# Patient Record
Sex: MALE | Race: WHITE | NOT HISPANIC OR LATINO | Employment: FULL TIME | URBAN - METROPOLITAN AREA
[De-identification: names, ages, dates, MRNs, and addresses within clinical notes are randomized per-mention and may not be internally consistent; named-entity substitution may affect disease eponyms.]

---

## 2017-02-12 ENCOUNTER — HOSPITAL ENCOUNTER (EMERGENCY)
Facility: HOSPITAL | Age: 27
Discharge: HOME | End: 2017-02-12
Attending: EMERGENCY MEDICINE | Admitting: EMERGENCY MEDICINE

## 2017-02-12 VITALS
DIASTOLIC BLOOD PRESSURE: 96 MMHG | TEMPERATURE: 98.9 F | RESPIRATION RATE: 14 BRPM | HEART RATE: 97 BPM | BODY MASS INDEX: 19.88 KG/M2 | WEIGHT: 150 LBS | SYSTOLIC BLOOD PRESSURE: 145 MMHG | OXYGEN SATURATION: 98 % | HEIGHT: 73 IN

## 2017-02-12 DIAGNOSIS — K08.89 TOOTHACHE: Primary | ICD-10-CM

## 2017-02-12 PROCEDURE — 99282 EMERGENCY DEPT VISIT SF MDM: CPT | Performed by: EMERGENCY MEDICINE

## 2017-02-12 PROCEDURE — 99283 EMERGENCY DEPT VISIT LOW MDM: CPT

## 2017-02-12 RX ORDER — PENICILLIN V POTASSIUM 500 MG/1
500 TABLET ORAL 4 TIMES DAILY
Qty: 40 TABLET | Refills: 0 | Status: SHIPPED | OUTPATIENT
Start: 2017-02-12

## 2017-02-12 RX ORDER — TRAMADOL HYDROCHLORIDE 50 MG/1
50 TABLET ORAL EVERY 6 HOURS PRN
Qty: 30 TABLET | Refills: 0 | Status: SHIPPED | OUTPATIENT
Start: 2017-02-12

## 2017-02-12 RX ORDER — PENICILLIN V POTASSIUM 250 MG/1
500 TABLET ORAL ONCE
Status: COMPLETED | OUTPATIENT
Start: 2017-02-12 | End: 2017-02-12

## 2017-02-12 RX ORDER — NAPROXEN 375 MG/1
375 TABLET ORAL ONCE
Status: COMPLETED | OUTPATIENT
Start: 2017-02-12 | End: 2017-02-12

## 2017-02-12 RX ADMIN — NAPROXEN 375 MG: 375 TABLET ORAL at 03:57

## 2017-02-12 RX ADMIN — PENICILLIN V POTASSIUM 500 MG: 250 TABLET ORAL at 03:56

## 2017-03-22 ENCOUNTER — HOSPITAL ENCOUNTER (EMERGENCY)
Facility: HOSPITAL | Age: 27
Discharge: HOME | End: 2017-03-23
Attending: EMERGENCY MEDICINE | Admitting: EMERGENCY MEDICINE

## 2017-03-22 DIAGNOSIS — R68.89 FLU-LIKE SYMPTOMS: Primary | ICD-10-CM

## 2017-03-22 DIAGNOSIS — R11.10 VOMITING, INTRACTABILITY OF VOMITING NOT SPECIFIED, PRESENCE OF NAUSEA NOT SPECIFIED, UNSPECIFIED VOMITING TYPE: ICD-10-CM

## 2017-03-22 LAB
ALBUMIN SERPL-MCNC: 4.1 G/DL (ref 3.5–5.2)
ALBUMIN/GLOB SERPL: 1.4 G/DL
ALP SERPL-CCNC: 73 U/L (ref 40–129)
ALT SERPL W P-5'-P-CCNC: 24 U/L (ref 5–41)
ANION GAP SERPL CALCULATED.3IONS-SCNC: 9.6 MMOL/L
AST SERPL-CCNC: 21 U/L (ref 5–40)
BASOPHILS # BLD AUTO: 0.02 10*3/MM3 (ref 0–0.2)
BASOPHILS NFR BLD AUTO: 0.2 % (ref 0–2)
BILIRUB SERPL-MCNC: 0.9 MG/DL (ref 0.2–1.2)
BUN BLD-MCNC: 10 MG/DL (ref 6–20)
BUN/CREAT SERPL: 9.3 (ref 7–25)
CALCIUM SPEC-SCNC: 8.8 MG/DL (ref 8.6–10.5)
CHLORIDE SERPL-SCNC: 101 MMOL/L (ref 98–107)
CO2 SERPL-SCNC: 25.4 MMOL/L (ref 22–29)
CREAT BLD-MCNC: 1.07 MG/DL (ref 0.76–1.27)
DEPRECATED RDW RBC AUTO: 39.1 FL (ref 37–54)
EOSINOPHIL # BLD AUTO: 0.1 10*3/MM3 (ref 0.1–0.3)
EOSINOPHIL NFR BLD AUTO: 0.9 % (ref 0–4)
ERYTHROCYTE [DISTWIDTH] IN BLOOD BY AUTOMATED COUNT: 12.6 % (ref 11.5–14.5)
FLUAV AG NPH QL: NEGATIVE
FLUBV AG NPH QL IA: NEGATIVE
GFR SERPL CREATININE-BSD FRML MDRD: 84 ML/MIN/1.73
GLOBULIN UR ELPH-MCNC: 3 GM/DL
GLUCOSE BLD-MCNC: 106 MG/DL (ref 65–99)
HCT VFR BLD AUTO: 43 % (ref 42–52)
HGB BLD-MCNC: 14.8 G/DL (ref 14–18)
IMM GRANULOCYTES # BLD: 0.03 10*3/MM3 (ref 0–0.03)
IMM GRANULOCYTES NFR BLD: 0.3 % (ref 0–0.5)
LIPASE SERPL-CCNC: 20 U/L (ref 13–60)
LYMPHOCYTES # BLD AUTO: 0.47 10*3/MM3 (ref 0.6–4.8)
LYMPHOCYTES NFR BLD AUTO: 4.3 % (ref 20–45)
MCH RBC QN AUTO: 29.5 PG (ref 27–31)
MCHC RBC AUTO-ENTMCNC: 34.4 G/DL (ref 31–37)
MCV RBC AUTO: 85.8 FL (ref 80–94)
MONOCYTES # BLD AUTO: 0.82 10*3/MM3 (ref 0–1)
MONOCYTES NFR BLD AUTO: 7.5 % (ref 3–8)
NEUTROPHILS # BLD AUTO: 9.55 10*3/MM3 (ref 1.5–8.3)
NEUTROPHILS NFR BLD AUTO: 86.8 % (ref 45–70)
NRBC BLD MANUAL-RTO: 0 /100 WBC (ref 0–0)
PLATELET # BLD AUTO: 243 10*3/MM3 (ref 140–500)
PMV BLD AUTO: 10.2 FL (ref 7.4–10.4)
POTASSIUM BLD-SCNC: 3.8 MMOL/L (ref 3.5–5.2)
PROT SERPL-MCNC: 7.1 G/DL (ref 6–8.5)
RBC # BLD AUTO: 5.01 10*6/MM3 (ref 4.7–6.1)
SODIUM BLD-SCNC: 136 MMOL/L (ref 136–145)
WBC NRBC COR # BLD: 10.99 10*3/MM3 (ref 4.8–10.8)

## 2017-03-22 PROCEDURE — 25010000002 ONDANSETRON PER 1 MG: Performed by: EMERGENCY MEDICINE

## 2017-03-22 PROCEDURE — 96375 TX/PRO/DX INJ NEW DRUG ADDON: CPT

## 2017-03-22 PROCEDURE — 87804 INFLUENZA ASSAY W/OPTIC: CPT | Performed by: EMERGENCY MEDICINE

## 2017-03-22 PROCEDURE — 80053 COMPREHEN METABOLIC PANEL: CPT | Performed by: EMERGENCY MEDICINE

## 2017-03-22 PROCEDURE — 83690 ASSAY OF LIPASE: CPT | Performed by: EMERGENCY MEDICINE

## 2017-03-22 PROCEDURE — 99282 EMERGENCY DEPT VISIT SF MDM: CPT | Performed by: EMERGENCY MEDICINE

## 2017-03-22 PROCEDURE — 99283 EMERGENCY DEPT VISIT LOW MDM: CPT

## 2017-03-22 PROCEDURE — 25810000003 DEXTROSE-NACL PER 500 ML: Performed by: EMERGENCY MEDICINE

## 2017-03-22 PROCEDURE — 85025 COMPLETE CBC W/AUTO DIFF WBC: CPT | Performed by: EMERGENCY MEDICINE

## 2017-03-22 PROCEDURE — 96365 THER/PROPH/DIAG IV INF INIT: CPT

## 2017-03-22 RX ORDER — SODIUM CHLORIDE 0.9 % (FLUSH) 0.9 %
10 SYRINGE (ML) INJECTION AS NEEDED
Status: DISCONTINUED | OUTPATIENT
Start: 2017-03-22 | End: 2017-03-23 | Stop reason: HOSPADM

## 2017-03-22 RX ORDER — ONDANSETRON 2 MG/ML
4 INJECTION INTRAMUSCULAR; INTRAVENOUS ONCE
Status: COMPLETED | OUTPATIENT
Start: 2017-03-22 | End: 2017-03-22

## 2017-03-22 RX ORDER — DEXTROSE AND SODIUM CHLORIDE 5; .9 G/100ML; G/100ML
1000 INJECTION, SOLUTION INTRAVENOUS ONCE
Status: COMPLETED | OUTPATIENT
Start: 2017-03-22 | End: 2017-03-22

## 2017-03-22 RX ORDER — IBUPROFEN 400 MG/1
800 TABLET ORAL ONCE
Status: COMPLETED | OUTPATIENT
Start: 2017-03-22 | End: 2017-03-22

## 2017-03-22 RX ORDER — ONDANSETRON 4 MG/1
TABLET, ORALLY DISINTEGRATING ORAL
Qty: 20 TABLET | Refills: 0 | Status: SHIPPED | OUTPATIENT
Start: 2017-03-22

## 2017-03-22 RX ADMIN — DEXTROSE AND SODIUM CHLORIDE 1000 ML/HR: 5; 900 INJECTION, SOLUTION INTRAVENOUS at 23:17

## 2017-03-22 RX ADMIN — ONDANSETRON 4 MG: 2 INJECTION, SOLUTION INTRAMUSCULAR; INTRAVENOUS at 23:18

## 2017-03-22 RX ADMIN — IBUPROFEN 800 MG: 400 TABLET ORAL at 23:30

## 2017-03-23 VITALS
HEART RATE: 88 BPM | RESPIRATION RATE: 16 BRPM | WEIGHT: 150 LBS | BODY MASS INDEX: 19.88 KG/M2 | DIASTOLIC BLOOD PRESSURE: 65 MMHG | SYSTOLIC BLOOD PRESSURE: 115 MMHG | OXYGEN SATURATION: 100 % | HEIGHT: 73 IN | TEMPERATURE: 100.4 F

## 2017-03-23 NOTE — ED PROVIDER NOTES
Subjective   History of Present Illness  History of Present Illness    Chief complaint: Flulike illness    Location: Diffuse myalgias    Quality/Severity:  Moderately severe    Timing/Duration: Gradually worsening over 6 hours    Modifying Factors: None    Associated Symptoms: Fever, vomiting    Narrative: 26-year-old male developed fever, chills, diffuse myalgias and generalized weakness with several episodes of vomiting in the last 6 hours.  He is concerned that he has flu.  Decreased urine output reported.    Review of Systems  All other systems reviewed and are otherwise negative  Past Medical History:   Diagnosis Date   • Asthma        No Known Allergies    Past Surgical History:   Procedure Laterality Date   • CLOSED REDUCTION METATARSAL FRACTURE     • DENTAL PROCEDURE     • FOOT SURGERY     • TONSILLECTOMY         History reviewed. No pertinent family history.    Social History     Social History   • Marital status: Single     Spouse name: N/A   • Number of children: N/A   • Years of education: N/A     Social History Main Topics   • Smoking status: Never Smoker   • Smokeless tobacco: None   • Alcohol use Yes      Comment: occasional once a month   • Drug use: No   • Sexual activity: Defer     Other Topics Concern   • None     Social History Narrative     ED Triage Vitals   Temp Heart Rate Resp BP SpO2   03/22/17 2258 03/22/17 2258 03/22/17 2258 03/22/17 2258 03/22/17 2258   100.1 °F (37.8 °C) 108 18 117/67 100 %      Temp src Heart Rate Source Patient Position BP Location FiO2 (%)   03/22/17 2258 -- -- -- --   Oral               Objective   Physical Exam   Constitutional: He is oriented to person, place, and time. He appears well-developed. No distress.   Ill-appearing   HENT:   Head: Normocephalic.   Mouth/Throat: Oropharynx is clear and moist.   TMs clear bilaterally   Eyes: Conjunctivae are normal. No scleral icterus.   Neck: Neck supple.   Painless movement; no meningismus or rigidity   Cardiovascular:  Regular rhythm.    Tachycardic   Pulmonary/Chest: Effort normal and breath sounds normal. No respiratory distress.   Abdominal: Soft. There is no tenderness.   Musculoskeletal:   MAEE, normal strength   Neurological: He is alert and oriented to person, place, and time.   Skin: Skin is warm and dry.   Psychiatric: He has a normal mood and affect. Thought content normal.   Nursing note and vitals reviewed.      Procedures    Results for orders placed or performed during the hospital encounter of 03/22/17   Influenza Antigen   Result Value Ref Range    Influenza A Ag, EIA Negative Negative    Influenza B Ag, EIA Negative Negative   Comprehensive Metabolic Panel   Result Value Ref Range    Glucose 106 (H) 65 - 99 mg/dL    BUN 10 6 - 20 mg/dL    Creatinine 1.07 0.76 - 1.27 mg/dL    Sodium 136 136 - 145 mmol/L    Potassium 3.8 3.5 - 5.2 mmol/L    Chloride 101 98 - 107 mmol/L    CO2 25.4 22.0 - 29.0 mmol/L    Calcium 8.8 8.6 - 10.5 mg/dL    Total Protein 7.1 6.0 - 8.5 g/dL    Albumin 4.10 3.50 - 5.20 g/dL    ALT (SGPT) 24 5 - 41 U/L    AST (SGOT) 21 5 - 40 U/L    Alkaline Phosphatase 73 40 - 129 U/L    Total Bilirubin 0.9 0.2 - 1.2 mg/dL    eGFR Non African Amer 84 >60 mL/min/1.73    Globulin 3.0 gm/dL    A/G Ratio 1.4 g/dL    BUN/Creatinine Ratio 9.3 7.0 - 25.0    Anion Gap 9.6 mmol/L   Lipase   Result Value Ref Range    Lipase 20 13 - 60 U/L   CBC Auto Differential   Result Value Ref Range    WBC 10.99 (H) 4.80 - 10.80 10*3/mm3    RBC 5.01 4.70 - 6.10 10*6/mm3    Hemoglobin 14.8 14.0 - 18.0 g/dL    Hematocrit 43.0 42.0 - 52.0 %    MCV 85.8 80.0 - 94.0 fL    MCH 29.5 27.0 - 31.0 pg    MCHC 34.4 31.0 - 37.0 g/dL    RDW 12.6 11.5 - 14.5 %    RDW-SD 39.1 37.0 - 54.0 fl    MPV 10.2 7.4 - 10.4 fL    Platelets 243 140 - 500 10*3/mm3    Neutrophil % 86.8 (H) 45.0 - 70.0 %    Lymphocyte % 4.3 (L) 20.0 - 45.0 %    Monocyte % 7.5 3.0 - 8.0 %    Eosinophil % 0.9 0.0 - 4.0 %    Basophil % 0.2 0.0 - 2.0 %    Immature Grans % 0.3 0.0 -  0.5 %    Neutrophils, Absolute 9.55 (H) 1.50 - 8.30 10*3/mm3    Lymphocytes, Absolute 0.47 (L) 0.60 - 4.80 10*3/mm3    Monocytes, Absolute 0.82 0.00 - 1.00 10*3/mm3    Eosinophils, Absolute 0.10 0.10 - 0.30 10*3/mm3    Basophils, Absolute 0.02 0.00 - 0.20 10*3/mm3    Immature Grans, Absolute 0.03 0.00 - 0.03 10*3/mm3    nRBC 0.0 0.0 - 0.0 /100 WBC            ED Course  ED Course   Comment By Time   Labs reviewed with patient.  Patient feels somewhat improved.  Plan discharge.  Patient agreeable. Gerard Pryor MD 03/23 0000                  MDM  Number of Diagnoses or Management Options  Flu-like symptoms:   Vomiting, intractability of vomiting not specified, presence of nausea not specified, unspecified vomiting type:      Amount and/or Complexity of Data Reviewed  Clinical lab tests: ordered and reviewed  Decide to obtain previous medical records or to obtain history from someone other than the patient: yes  Review and summarize past medical records: yes (Prior ED visits)        Final diagnoses:   Flu-like symptoms   Vomiting, intractability of vomiting not specified, presence of nausea not specified, unspecified vomiting type              Medication List      New Prescriptions          ondansetron ODT 4 MG disintegrating tablet   Commonly known as:  ZOFRAN ODT   Take one tablet by mouth every 6 hours as needed for nausea and vomiting         Stop          ondansetron 8 MG tablet   Commonly known as:  ZOFRAN       penicillin v potassium 500 MG tablet   Commonly known as:  VEETID       promethazine 25 MG tablet   Commonly known as:  PHENERGAN       traMADol 50 MG tablet   Commonly known as:  ULTRAM                  Gerard Pryor MD  03/23/17 0001

## 2017-06-04 ENCOUNTER — HOSPITAL ENCOUNTER (EMERGENCY)
Facility: HOSPITAL | Age: 27
Discharge: HOME | End: 2017-06-05
Attending: EMERGENCY MEDICINE | Admitting: EMERGENCY MEDICINE

## 2017-06-04 VITALS
HEIGHT: 73 IN | HEART RATE: 63 BPM | DIASTOLIC BLOOD PRESSURE: 88 MMHG | OXYGEN SATURATION: 99 % | RESPIRATION RATE: 16 BRPM | BODY MASS INDEX: 19.88 KG/M2 | WEIGHT: 150 LBS | SYSTOLIC BLOOD PRESSURE: 127 MMHG | TEMPERATURE: 98 F

## 2017-06-04 DIAGNOSIS — R19.7 DIARRHEA, UNSPECIFIED TYPE: Primary | ICD-10-CM

## 2017-06-04 PROCEDURE — 99283 EMERGENCY DEPT VISIT LOW MDM: CPT

## 2017-06-04 RX ORDER — SODIUM CHLORIDE 0.9 % (FLUSH) 0.9 %
10 SYRINGE (ML) INJECTION AS NEEDED
Status: DISCONTINUED | OUTPATIENT
Start: 2017-06-04 | End: 2017-06-05

## 2017-06-04 RX ORDER — LOPERAMIDE HYDROCHLORIDE 2 MG/1
4 CAPSULE ORAL ONCE
Status: COMPLETED | OUTPATIENT
Start: 2017-06-04 | End: 2017-06-05

## 2017-06-05 PROCEDURE — 99282 EMERGENCY DEPT VISIT SF MDM: CPT | Performed by: EMERGENCY MEDICINE

## 2017-06-05 RX ORDER — LOPERAMIDE HYDROCHLORIDE 2 MG/1
2 CAPSULE ORAL 4 TIMES DAILY PRN
Qty: 20 CAPSULE | Refills: 0 | Status: SHIPPED | OUTPATIENT
Start: 2017-06-05

## 2017-06-05 RX ADMIN — LOPERAMIDE HYDROCHLORIDE 4 MG: 2 CAPSULE ORAL at 00:18

## 2017-06-05 NOTE — DISCHARGE INSTRUCTIONS
Drink plenty of fluids as tolerated.  Please return to the ER for any worsening pain, fevers, nausea, vomiting, diarrhea, weakness, or any other concerns.

## 2017-06-05 NOTE — ED PROVIDER NOTES
Subjective   History of Present Illness  History of Present Illness    Chief complaint: Diarrhea    Location: Intestinal    Quality/Severity:  Moderate diarrhea, mild cramping    Timing/Duration: Began this morning, continued all day    Modifying Factors: None    Narrative: This patient presents for evaluation of new onset diarrhea today.  He says the diarrhea began this morning and he has continued with multiple episodes throughout the day.  He denies any blood in his stool.  He denies any nausea or vomiting symptoms.  He denies any fevers.  He has had some mild lower abdominal cramping but no back pain.  There are no known sick contacts.  He has not taken any medicines for the diarrhea so far today.  Expresses some concern because his father was diagnosed with Crohn's disease later in adult head.  This patient has never met with a GI specialist in the past.  He's never had any kind of endoscopy procedures done.    Associated Symptoms: None    Review of Systems   Constitutional: Negative for activity change, chills and fever.   HENT: Negative.    Eyes: Negative for pain and visual disturbance.   Respiratory: Negative for cough and shortness of breath.    Cardiovascular: Negative for chest pain.   Gastrointestinal: Positive for abdominal pain (mild lower only) and diarrhea. Negative for anal bleeding, blood in stool, constipation, nausea, rectal pain and vomiting.   Genitourinary: Negative for dysuria.   Musculoskeletal: Negative for back pain and myalgias.   Skin: Negative for color change and wound.   Neurological: Negative for syncope and headaches.   Psychiatric/Behavioral: Negative for agitation and confusion.   All other systems reviewed and are negative.      Past Medical History:   Diagnosis Date   • Asthma        No Known Allergies    Past Surgical History:   Procedure Laterality Date   • CLOSED REDUCTION METATARSAL FRACTURE     • DENTAL PROCEDURE     • FOOT SURGERY     • TONSILLECTOMY         No family  history on file.    Social History     Social History   • Marital status: Single     Spouse name: N/A   • Number of children: N/A   • Years of education: N/A     Social History Main Topics   • Smoking status: Never Smoker   • Smokeless tobacco: None   • Alcohol use Yes      Comment: occasional once a month   • Drug use: No   • Sexual activity: Defer     Other Topics Concern   • None     Social History Narrative       ED Triage Vitals   Temp Heart Rate Resp BP SpO2   06/04/17 2337 06/04/17 2337 06/04/17 2337 06/04/17 2337 06/04/17 2337   98 °F (36.7 °C) 63 16 127/88 99 %      Temp src Heart Rate Source Patient Position BP Location FiO2 (%)   -- -- -- -- --                Objective   Physical Exam   Constitutional: He is oriented to person, place, and time. He appears well-developed and well-nourished. No distress.   HENT:   Head: Normocephalic and atraumatic.   Eyes: EOM are normal. Pupils are equal, round, and reactive to light. Right eye exhibits no discharge. Left eye exhibits no discharge.   Neck: Normal range of motion. Neck supple.   Cardiovascular: Normal rate, regular rhythm and intact distal pulses.    Pulmonary/Chest: Effort normal. No respiratory distress. He has no wheezes.   Abdominal: Soft. He exhibits no mass. There is no tenderness. There is no rebound and no guarding. No hernia.   Completely benign abdominal exam in all 4 quadrants.  No peritoneal signs.   Musculoskeletal: Normal range of motion. He exhibits no edema or deformity.   Neurological: He is alert and oriented to person, place, and time. He exhibits normal muscle tone.   Skin: Skin is warm and dry. No rash noted. He is not diaphoretic. No erythema. No pallor.   Psychiatric: He has a normal mood and affect. His behavior is normal. Judgment and thought content normal.   Nursing note and vitals reviewed.      Procedures         ED Course  ED Course   Comment By Time   Patient presents for evaluation of diarrhea.  He tells me he has had 4  episodes of diarrhea today.  No blood in stool.  His vital signs are reassuring.  He has had no vomiting and clinically shows no signs dehydration.  No worrisome findings on exam right now.  No indication for diagnostics.  We'll give him one dose of Imodium and discharged home with continued symptomatic management. Seth Beasley MD 06/05 0039                  MDM  Number of Diagnoses or Management Options  Diarrhea, unspecified type:   Risk of Complications, Morbidity, and/or Mortality  Presenting problems: moderate  Diagnostic procedures: low  Management options: low        Final diagnoses:   Diarrhea, unspecified type            Seth Beasley MD  06/05/17 0039

## 2017-11-05 ENCOUNTER — HOSPITAL ENCOUNTER (EMERGENCY)
Facility: HOSPITAL | Age: 27
Discharge: HOME | End: 2017-11-05
Attending: EMERGENCY MEDICINE | Admitting: EMERGENCY MEDICINE

## 2017-11-05 VITALS
TEMPERATURE: 98.5 F | WEIGHT: 150 LBS | RESPIRATION RATE: 16 BRPM | DIASTOLIC BLOOD PRESSURE: 69 MMHG | HEART RATE: 67 BPM | HEIGHT: 73 IN | OXYGEN SATURATION: 95 % | SYSTOLIC BLOOD PRESSURE: 121 MMHG | BODY MASS INDEX: 19.88 KG/M2

## 2017-11-05 DIAGNOSIS — R30.0 DYSURIA: Primary | ICD-10-CM

## 2017-11-05 LAB
BILIRUB UR QL STRIP: NEGATIVE
CLARITY UR: CLEAR
COLOR UR: YELLOW
GLUCOSE UR STRIP-MCNC: NEGATIVE MG/DL
HGB UR QL STRIP.AUTO: NEGATIVE
KETONES UR QL STRIP: NEGATIVE
LEUKOCYTE ESTERASE UR QL STRIP.AUTO: NEGATIVE
NITRITE UR QL STRIP: NEGATIVE
PH UR STRIP.AUTO: 8.5 [PH] (ref 4.5–8)
PROT UR QL STRIP: NEGATIVE
SP GR UR STRIP: 1.02 (ref 1–1.03)
UROBILINOGEN UR QL STRIP: ABNORMAL

## 2017-11-05 PROCEDURE — 87591 N.GONORRHOEAE DNA AMP PROB: CPT | Performed by: EMERGENCY MEDICINE

## 2017-11-05 PROCEDURE — 81003 URINALYSIS AUTO W/O SCOPE: CPT | Performed by: EMERGENCY MEDICINE

## 2017-11-05 PROCEDURE — 99282 EMERGENCY DEPT VISIT SF MDM: CPT | Performed by: EMERGENCY MEDICINE

## 2017-11-05 PROCEDURE — 87491 CHLMYD TRACH DNA AMP PROBE: CPT | Performed by: EMERGENCY MEDICINE

## 2017-11-05 PROCEDURE — 99283 EMERGENCY DEPT VISIT LOW MDM: CPT

## 2017-11-05 RX ORDER — DOXYCYCLINE HYCLATE 100 MG
100 TABLET ORAL ONCE
Status: COMPLETED | OUTPATIENT
Start: 2017-11-05 | End: 2017-11-05

## 2017-11-05 RX ORDER — DOXYCYCLINE 100 MG/1
100 CAPSULE ORAL 2 TIMES DAILY
Qty: 20 CAPSULE | Refills: 0 | Status: SHIPPED | OUTPATIENT
Start: 2017-11-05

## 2017-11-05 RX ORDER — HYDROCODONE BITARTRATE AND ACETAMINOPHEN 5; 325 MG/1; MG/1
1 TABLET ORAL ONCE
Status: COMPLETED | OUTPATIENT
Start: 2017-11-05 | End: 2017-11-05

## 2017-11-05 RX ADMIN — DOXYCYCLINE HYCLATE 100 MG: 100 TABLET, COATED ORAL at 22:39

## 2017-11-05 RX ADMIN — HYDROCODONE BITARTRATE AND ACETAMINOPHEN 1 TABLET: 5; 325 TABLET ORAL at 21:35

## 2017-11-06 NOTE — ED PROVIDER NOTES
Subjective   History of Present Illness  History of Present Illness    Chief complaint: Burning on urination    Location: Penis    Quality/Severity:  Moderate burning    Timing/Onset/Duration: Gradual onset over the last few days    Modifying Factors: It hurts to urinate, feels better not to urinate    Associated Symptoms: No headache.  No fever chills or cough.  No sore throat earache or nasal congestion.  No chest pain or shortness of breath.  No abdominal pain.  No diarrhea.  No nausea or vomiting.    Narrative: This 27-year-old white male presents with burning at the end of urination for the last couple days.  He denies any fever or chills.  He denies any urethral discharge.  There is no history of STDs.  There is no abdominal pain.  There is no nausea or vomiting.    PCP:  No Known Provider      Review of Systems   Constitutional: Negative for chills and fever.   HENT: Negative for sore throat.    Respiratory: Negative for cough and chest tightness.    Cardiovascular: Negative for chest pain.   Gastrointestinal: Negative for abdominal pain, blood in stool, constipation, diarrhea, nausea and vomiting.   Genitourinary: Positive for dysuria. Negative for decreased urine volume, flank pain, frequency, hematuria and urgency.   Musculoskeletal: Negative for back pain.   Skin: Negative for rash.   Neurological: Negative for headaches.   Psychiatric/Behavioral: Negative.  Negative for confusion.        Medication List      START taking these medications          doxycycline 100 MG capsule   Commonly known as:  MONODOX   Take 1 capsule by mouth 2 (Two) Times a Day.         CONTINUE taking these medications          ESOMEPRAZOLE STRONTIUM PO       ibuprofen 200 MG tablet   Commonly known as:  ADVIL,MOTRIN       loperamide 2 MG capsule   Commonly known as:  IMODIUM   Take 1 capsule by mouth 4 (Four) Times a Day As Needed for Diarrhea.       ondansetron 8 MG tablet   Commonly known as:  ZOFRAN   Take 1 tablet by mouth  Every 8 (Eight) Hours As Needed for nausea or   vomiting.       ondansetron ODT 4 MG disintegrating tablet   Commonly known as:  ZOFRAN ODT   Take one tablet by mouth every 6 hours as needed for nausea and vomiting       penicillin v potassium 500 MG tablet   Commonly known as:  VEETID   Take 1 tablet by mouth 4 (Four) Times a Day.       promethazine 25 MG tablet   Commonly known as:  PHENERGAN   Take 1 tablet by mouth Every 6 (Six) Hours As Needed for nausea or   vomiting for up to 12 doses.       traMADol 50 MG tablet   Commonly known as:  ULTRAM   Take 1 tablet by mouth Every 6 (Six) Hours As Needed for moderate pain   (4-6).           Past Medical History:   Diagnosis Date   • Asthma        No Known Allergies    Past Surgical History:   Procedure Laterality Date   • CLOSED REDUCTION METATARSAL FRACTURE     • DENTAL PROCEDURE     • FOOT SURGERY     • TONSILLECTOMY         No family history on file.    Social History     Social History   • Marital status: Single     Spouse name: N/A   • Number of children: N/A   • Years of education: N/A     Social History Main Topics   • Smoking status: Never Smoker   • Smokeless tobacco: Not on file   • Alcohol use Yes      Comment: occasional once a month   • Drug use: No   • Sexual activity: Defer     Other Topics Concern   • Not on file     Social History Narrative           Objective   Physical Exam   Constitutional: He is oriented to person, place, and time. He appears well-developed and well-nourished. No distress.   ED Triage Vitals:  Temp: 98.2 °F (36.8 °C) (11/05/17 2026)  Heart Rate: 62 (11/05/17 2026)  Resp: 14 (11/05/17 2026)  BP: 119/76 (11/05/17 2026)  SpO2: 100 % (11/05/17 2026)  Temp src: Oral (11/05/17 2026)  Heart Rate Source: n/a  Patient Position: n/a  BP Location: n/a  FiO2 (%): n/a    The patient's vitals were reviewed by me.  Unless otherwise noted they are within normal limits.     HENT:   Head: Normocephalic and atraumatic.   Right Ear: External ear  normal.   Left Ear: External ear normal.   Nose: Nose normal.   Mouth/Throat: Oropharynx is clear and moist.   Eyes: Conjunctivae and EOM are normal. Pupils are equal, round, and reactive to light. Right eye exhibits no discharge. Left eye exhibits no discharge. No scleral icterus.   Neck: Normal range of motion. Neck supple. No JVD present. No tracheal deviation present. No thyromegaly present.   Cardiovascular: Normal rate, regular rhythm, normal heart sounds and intact distal pulses.  Exam reveals no gallop and no friction rub.    No murmur heard.  There is no urethral discharge.  There are no rashes.  The testicles are of normal live nontender.  There is no epididymal tenderness.  There is no masses.  There is no hernias.   Pulmonary/Chest: Effort normal and breath sounds normal. No stridor. No respiratory distress. He has no wheezes. He has no rales. He exhibits no tenderness.   Abdominal: Soft. Bowel sounds are normal. He exhibits no distension and no mass. There is no tenderness. There is no rebound and no guarding. No hernia.   Musculoskeletal: Normal range of motion. He exhibits no edema or deformity.   Lymphadenopathy:     He has no cervical adenopathy.   Neurological: He is alert and oriented to person, place, and time.   Skin: Skin is warm and dry. No rash noted. He is not diaphoretic. No erythema. No pallor.   Psychiatric: His behavior is normal.   Nursing note and vitals reviewed.      Procedures         ED Course  ED Course   Comment By Time   Urinalysis was reviewed by me.  There is no abnormality. Jarad Brenner MD 11/05 2220      12:39 AM, 11/06/17:  The patient's diagnosis of dysuria was discussed with him.  The plan will be to place him on chlamydia.  He should follow-up his GC chlamydia results in 2 days with a physician from the physician referral list.  He should return to emerge from the pain is increasing pain, fever, worse in any way at all.  All the patient's questions were answered the  patient will be discharged in good condition.            [facility]  No orders to display     Labs Reviewed   URINALYSIS W/ CULTURE IF INDICATED - Abnormal; Notable for the following:        Result Value    pH, UA 8.5 (*)     All other components within normal limits    Narrative:     Urine microscopic not indicated.   CHLAMYDIA TRACHOMATIS, NEISSERIA GONORRHOEAE, PCR W/ CONFIRMATION         Final diagnoses:   Dysuria         ED Medications:  Medications   HYDROcodone-acetaminophen (NORCO) 5-325 MG per tablet 1 tablet (1 tablet Oral Given 11/5/17 2135)   doxycycline (VIBRAMYICN) tablet 100 mg (100 mg Oral Given 11/5/17 2239)       New Medications:     Medication List      START taking these medications          doxycycline 100 MG capsule   Commonly known as:  MONODOX   Take 1 capsule by mouth 2 (Two) Times a Day.         CONTINUE taking these medications          ESOMEPRAZOLE STRONTIUM PO       ibuprofen 200 MG tablet   Commonly known as:  ADVIL,MOTRIN       loperamide 2 MG capsule   Commonly known as:  IMODIUM   Take 1 capsule by mouth 4 (Four) Times a Day As Needed for Diarrhea.       ondansetron 8 MG tablet   Commonly known as:  ZOFRAN   Take 1 tablet by mouth Every 8 (Eight) Hours As Needed for nausea or   vomiting.       ondansetron ODT 4 MG disintegrating tablet   Commonly known as:  ZOFRAN ODT   Take one tablet by mouth every 6 hours as needed for nausea and vomiting       penicillin v potassium 500 MG tablet   Commonly known as:  VEETID   Take 1 tablet by mouth 4 (Four) Times a Day.       promethazine 25 MG tablet   Commonly known as:  PHENERGAN   Take 1 tablet by mouth Every 6 (Six) Hours As Needed for nausea or   vomiting for up to 12 doses.       traMADol 50 MG tablet   Commonly known as:  ULTRAM   Take 1 tablet by mouth Every 6 (Six) Hours As Needed for moderate pain   (4-6).           Stopped Medications:     Medication List      START taking these medications          doxycycline 100 MG capsule   Commonly  known as:  MONODOX   Take 1 capsule by mouth 2 (Two) Times a Day.         CONTINUE taking these medications          ESOMEPRAZOLE STRONTIUM PO       ibuprofen 200 MG tablet   Commonly known as:  ADVIL,MOTRIN       loperamide 2 MG capsule   Commonly known as:  IMODIUM   Take 1 capsule by mouth 4 (Four) Times a Day As Needed for Diarrhea.       ondansetron 8 MG tablet   Commonly known as:  ZOFRAN   Take 1 tablet by mouth Every 8 (Eight) Hours As Needed for nausea or   vomiting.       ondansetron ODT 4 MG disintegrating tablet   Commonly known as:  ZOFRAN ODT   Take one tablet by mouth every 6 hours as needed for nausea and vomiting       penicillin v potassium 500 MG tablet   Commonly known as:  VEETID   Take 1 tablet by mouth 4 (Four) Times a Day.       promethazine 25 MG tablet   Commonly known as:  PHENERGAN   Take 1 tablet by mouth Every 6 (Six) Hours As Needed for nausea or   vomiting for up to 12 doses.       traMADol 50 MG tablet   Commonly known as:  ULTRAM   Take 1 tablet by mouth Every 6 (Six) Hours As Needed for moderate pain   (4-6).             Final diagnoses:   Dysuria            Jarad Brenner MD  11/06/17 0040

## 2017-11-06 NOTE — DISCHARGE INSTRUCTIONS
Call a physician from the physician referral list for primary care provider follow-up with within one week.  Return to emergency department if there is fever, increased pain, vomiting, worse in any way at all.

## 2017-11-08 LAB
C TRACH RRNA SPEC DONR QL NAA+PROBE: NEGATIVE
N GONORRHOEA DNA SPEC QL NAA+PROBE: NEGATIVE

## 2017-12-17 ENCOUNTER — HOSPITAL ENCOUNTER (EMERGENCY)
Facility: HOSPITAL | Age: 27
Discharge: HOME | End: 2017-12-17
Attending: EMERGENCY MEDICINE | Admitting: EMERGENCY MEDICINE

## 2017-12-17 VITALS
TEMPERATURE: 97.4 F | OXYGEN SATURATION: 100 % | SYSTOLIC BLOOD PRESSURE: 117 MMHG | RESPIRATION RATE: 20 BRPM | HEART RATE: 97 BPM | WEIGHT: 163 LBS | BODY MASS INDEX: 24.14 KG/M2 | HEIGHT: 69 IN | DIASTOLIC BLOOD PRESSURE: 76 MMHG

## 2017-12-17 DIAGNOSIS — R55 VASOVAGAL SYNCOPE: Primary | ICD-10-CM

## 2017-12-17 DIAGNOSIS — R11.2 NON-INTRACTABLE VOMITING WITH NAUSEA, UNSPECIFIED VOMITING TYPE: ICD-10-CM

## 2017-12-17 LAB
ALBUMIN SERPL-MCNC: 4.4 G/DL (ref 3.5–5.2)
ALBUMIN/GLOB SERPL: 1.5 G/DL
ALP SERPL-CCNC: 75 U/L (ref 40–129)
ALT SERPL W P-5'-P-CCNC: 18 U/L (ref 5–41)
ANION GAP SERPL CALCULATED.3IONS-SCNC: 10.5 MMOL/L
AST SERPL-CCNC: 19 U/L (ref 5–40)
BASOPHILS # BLD AUTO: 0.03 10*3/MM3 (ref 0–0.2)
BASOPHILS NFR BLD AUTO: 0.2 % (ref 0–2)
BILIRUB SERPL-MCNC: 0.5 MG/DL (ref 0.2–1.2)
BUN BLD-MCNC: 10 MG/DL (ref 6–20)
BUN/CREAT SERPL: 8.8 (ref 7–25)
CALCIUM SPEC-SCNC: 9.1 MG/DL (ref 8.6–10.5)
CHLORIDE SERPL-SCNC: 98 MMOL/L (ref 98–107)
CO2 SERPL-SCNC: 26.5 MMOL/L (ref 22–29)
CREAT BLD-MCNC: 1.14 MG/DL (ref 0.76–1.27)
DEPRECATED RDW RBC AUTO: 38.3 FL (ref 37–54)
EOSINOPHIL # BLD AUTO: 0.2 10*3/MM3 (ref 0.1–0.3)
EOSINOPHIL NFR BLD AUTO: 1.3 % (ref 0–4)
ERYTHROCYTE [DISTWIDTH] IN BLOOD BY AUTOMATED COUNT: 12.2 % (ref 11.5–14.5)
FLUAV AG NPH QL: NEGATIVE
FLUBV AG NPH QL IA: NEGATIVE
GFR SERPL CREATININE-BSD FRML MDRD: 78 ML/MIN/1.73
GLOBULIN UR ELPH-MCNC: 2.9 GM/DL
GLUCOSE BLD-MCNC: 102 MG/DL (ref 65–99)
HCT VFR BLD AUTO: 44 % (ref 42–52)
HGB BLD-MCNC: 15.3 G/DL (ref 14–18)
IMM GRANULOCYTES # BLD: 0.04 10*3/MM3 (ref 0–0.03)
IMM GRANULOCYTES NFR BLD: 0.3 % (ref 0–0.5)
LIPASE SERPL-CCNC: 21 U/L (ref 13–60)
LYMPHOCYTES # BLD AUTO: 0.81 10*3/MM3 (ref 0.6–4.8)
LYMPHOCYTES NFR BLD AUTO: 5.1 % (ref 20–45)
MCH RBC QN AUTO: 29.8 PG (ref 27–31)
MCHC RBC AUTO-ENTMCNC: 34.8 G/DL (ref 31–37)
MCV RBC AUTO: 85.6 FL (ref 80–94)
MONOCYTES # BLD AUTO: 1.38 10*3/MM3 (ref 0–1)
MONOCYTES NFR BLD AUTO: 8.7 % (ref 3–8)
NEUTROPHILS # BLD AUTO: 13.49 10*3/MM3 (ref 1.5–8.3)
NEUTROPHILS NFR BLD AUTO: 84.4 % (ref 45–70)
NRBC BLD MANUAL-RTO: 0 /100 WBC (ref 0–0)
PLATELET # BLD AUTO: 309 10*3/MM3 (ref 140–500)
PMV BLD AUTO: 10.7 FL (ref 7.4–10.4)
POTASSIUM BLD-SCNC: 4.2 MMOL/L (ref 3.5–5.2)
PROT SERPL-MCNC: 7.3 G/DL (ref 6–8.5)
RBC # BLD AUTO: 5.14 10*6/MM3 (ref 4.7–6.1)
SODIUM BLD-SCNC: 135 MMOL/L (ref 136–145)
TROPONIN T SERPL-MCNC: <0.01 NG/ML (ref 0–0.03)
WBC NRBC COR # BLD: 15.95 10*3/MM3 (ref 4.8–10.8)

## 2017-12-17 PROCEDURE — 99284 EMERGENCY DEPT VISIT MOD MDM: CPT | Performed by: EMERGENCY MEDICINE

## 2017-12-17 PROCEDURE — 25010000002 ONDANSETRON PER 1 MG: Performed by: EMERGENCY MEDICINE

## 2017-12-17 PROCEDURE — 96374 THER/PROPH/DIAG INJ IV PUSH: CPT

## 2017-12-17 PROCEDURE — 93005 ELECTROCARDIOGRAM TRACING: CPT | Performed by: EMERGENCY MEDICINE

## 2017-12-17 PROCEDURE — 85025 COMPLETE CBC W/AUTO DIFF WBC: CPT | Performed by: EMERGENCY MEDICINE

## 2017-12-17 PROCEDURE — 96361 HYDRATE IV INFUSION ADD-ON: CPT

## 2017-12-17 PROCEDURE — 80053 COMPREHEN METABOLIC PANEL: CPT | Performed by: EMERGENCY MEDICINE

## 2017-12-17 PROCEDURE — 83690 ASSAY OF LIPASE: CPT | Performed by: EMERGENCY MEDICINE

## 2017-12-17 PROCEDURE — 87804 INFLUENZA ASSAY W/OPTIC: CPT | Performed by: EMERGENCY MEDICINE

## 2017-12-17 PROCEDURE — 93010 ELECTROCARDIOGRAM REPORT: CPT | Performed by: INTERNAL MEDICINE

## 2017-12-17 PROCEDURE — 84484 ASSAY OF TROPONIN QUANT: CPT | Performed by: EMERGENCY MEDICINE

## 2017-12-17 PROCEDURE — 99284 EMERGENCY DEPT VISIT MOD MDM: CPT

## 2017-12-17 RX ORDER — ONDANSETRON 2 MG/ML
4 INJECTION INTRAMUSCULAR; INTRAVENOUS ONCE
Status: COMPLETED | OUTPATIENT
Start: 2017-12-17 | End: 2017-12-17

## 2017-12-17 RX ORDER — SODIUM CHLORIDE 0.9 % (FLUSH) 0.9 %
10 SYRINGE (ML) INJECTION AS NEEDED
Status: DISCONTINUED | OUTPATIENT
Start: 2017-12-17 | End: 2017-12-17 | Stop reason: HOSPADM

## 2017-12-17 RX ORDER — PROMETHAZINE HYDROCHLORIDE 25 MG/1
TABLET ORAL
Qty: 30 TABLET | Refills: 0 | Status: SHIPPED | OUTPATIENT
Start: 2017-12-17

## 2017-12-17 RX ADMIN — SODIUM CHLORIDE 1000 ML: 9 INJECTION, SOLUTION INTRAVENOUS at 04:13

## 2017-12-17 RX ADMIN — ONDANSETRON 4 MG: 2 INJECTION, SOLUTION INTRAMUSCULAR; INTRAVENOUS at 04:12

## 2017-12-17 NOTE — ED PROVIDER NOTES
Subjective   History of Present Illness  History of Present Illness    Chief complaint: Syncope, generalized weakness    Location: No focal pain complaint    Quality/Severity:  Moderate    Timing/Duration: Abrupt onset shortly prior to arrival    Modifying Factors: Worse with standing up    Associated Symptoms: Nausea with vomiting preceded event; generalized weakness    Narrative: 27-year-old male presents the emergency department via EMS for evaluation following an episode of syncope that was preceded by nausea and vomiting.  The patient had felt well earlier in the day but after eating his lunch this morning he became nauseated and vomited.  Once he was up walking around he passed out.  Right now he feels weak and nauseated but has no focal pain complaint.  His wife was ill recently with similar GI complaints.    Review of Systems  All other systems reviewed and are otherwise negative.  Past Medical History:   Diagnosis Date   • Asthma        No Known Allergies    Past Surgical History:   Procedure Laterality Date   • CLOSED REDUCTION METATARSAL FRACTURE     • DENTAL PROCEDURE     • FOOT SURGERY     • TONSILLECTOMY         History reviewed. No pertinent family history.    Social History     Social History   • Marital status: Single     Spouse name: N/A   • Number of children: N/A   • Years of education: N/A     Social History Main Topics   • Smoking status: Never Smoker   • Smokeless tobacco: None   • Alcohol use Yes      Comment: occasional once a month   • Drug use: No   • Sexual activity: Defer     Other Topics Concern   • None     Social History Narrative           Objective   Physical Exam   Constitutional: He is oriented to person, place, and time. He appears well-developed. No distress.   HENT:   Head: Normocephalic.   Mouth/Throat: Oropharynx is clear and moist.   Eyes: Conjunctivae are normal. No scleral icterus.   Neck: Neck supple.   Painless movement   Cardiovascular: Normal rate, regular rhythm and  intact distal pulses.    Pulmonary/Chest: Effort normal and breath sounds normal. No respiratory distress.   Abdominal: Soft. There is no tenderness.   Musculoskeletal:   MAEE, normal strength   Neurological: He is alert and oriented to person, place, and time. No cranial nerve deficit. He exhibits normal muscle tone. Coordination normal.   Skin: Skin is warm and dry.   Psychiatric: He has a normal mood and affect. Thought content normal.   Nursing note and vitals reviewed.      Procedures         ED Course  ED Course   Comment By Time   Patient is sleeping.  No recurrent vomiting.  Patient wakes with tactile stimuli.  Complains of mild nausea that persists but no ongoing pain.  Repeat abdominal exam is benign.  Reviewed labs and discharge plan.  Patient agreeable Gerard Pryor MD 12/17 0525      EKG           EKG time: 0424  Rhythm/Rate: Sinus, 80  P waves and IA: TAMANNA within normal limits  QRS, axis: Narrow QRS complex with prominent QRS   ST and T waves: Diffuse ST abnormality may be related to repolarization.  There is no reciprocal change to suggest that this is evidence of acute ischemia; QTC within normal limits     Interpreted contemporaneously with treatment by me, independently viewed  Comparison unavailable    Results for orders placed or performed during the hospital encounter of 12/17/17   Influenza Antigen, Rapid - Swab, Nasopharynx   Result Value Ref Range    Influenza A Ag, EIA Negative Negative    Influenza B Ag, EIA Negative Negative   Comprehensive Metabolic Panel   Result Value Ref Range    Glucose 102 (H) 65 - 99 mg/dL    BUN 10 6 - 20 mg/dL    Creatinine 1.14 0.76 - 1.27 mg/dL    Sodium 135 (L) 136 - 145 mmol/L    Potassium 4.2 3.5 - 5.2 mmol/L    Chloride 98 98 - 107 mmol/L    CO2 26.5 22.0 - 29.0 mmol/L    Calcium 9.1 8.6 - 10.5 mg/dL    Total Protein 7.3 6.0 - 8.5 g/dL    Albumin 4.40 3.50 - 5.20 g/dL    ALT (SGPT) 18 5 - 41 U/L    AST (SGOT) 19 5 - 40 U/L    Alkaline Phosphatase 75 40 - 129  U/L    Total Bilirubin 0.5 0.2 - 1.2 mg/dL    eGFR Non African Amer 78 >60 mL/min/1.73    Globulin 2.9 gm/dL    A/G Ratio 1.5 g/dL    BUN/Creatinine Ratio 8.8 7.0 - 25.0    Anion Gap 10.5 mmol/L   Lipase   Result Value Ref Range    Lipase 21 13 - 60 U/L   CBC Auto Differential   Result Value Ref Range    WBC 15.95 (H) 4.80 - 10.80 10*3/mm3    RBC 5.14 4.70 - 6.10 10*6/mm3    Hemoglobin 15.3 14.0 - 18.0 g/dL    Hematocrit 44.0 42.0 - 52.0 %    MCV 85.6 80.0 - 94.0 fL    MCH 29.8 27.0 - 31.0 pg    MCHC 34.8 31.0 - 37.0 g/dL    RDW 12.2 11.5 - 14.5 %    RDW-SD 38.3 37.0 - 54.0 fl    MPV 10.7 (H) 7.4 - 10.4 fL    Platelets 309 140 - 500 10*3/mm3    Neutrophil % 84.4 (H) 45.0 - 70.0 %    Lymphocyte % 5.1 (L) 20.0 - 45.0 %    Monocyte % 8.7 (H) 3.0 - 8.0 %    Eosinophil % 1.3 0.0 - 4.0 %    Basophil % 0.2 0.0 - 2.0 %    Immature Grans % 0.3 0.0 - 0.5 %    Neutrophils, Absolute 13.49 (H) 1.50 - 8.30 10*3/mm3    Lymphocytes, Absolute 0.81 0.60 - 4.80 10*3/mm3    Monocytes, Absolute 1.38 (H) 0.00 - 1.00 10*3/mm3    Eosinophils, Absolute 0.20 0.10 - 0.30 10*3/mm3    Basophils, Absolute 0.03 0.00 - 0.20 10*3/mm3    Immature Grans, Absolute 0.04 (H) 0.00 - 0.03 10*3/mm3    nRBC 0.0 0.0 - 0.0 /100 WBC   Troponin   Result Value Ref Range    Troponin T <0.010 0.000 - 0.030 ng/mL               MDM  Number of Diagnoses or Management Options  Non-intractable vomiting with nausea, unspecified vomiting type: new and requires workup  Vasovagal syncope: new and requires workup     Amount and/or Complexity of Data Reviewed  Clinical lab tests: reviewed and ordered  Independent visualization of images, tracings, or specimens: yes        Final diagnoses:   Vasovagal syncope   Non-intractable vomiting with nausea, unspecified vomiting type              Medication List      Changed          promethazine 25 MG tablet   Commonly known as:  PHENERGAN   Take one tablet by mouth every 6 hours as needed for nausea and vomiting   What changed:    -  how much to take  - how to take this  - when to take this  - reasons to take this  - additional instructions         Stop          doxycycline 100 MG capsule   Commonly known as:  MONODOX       loperamide 2 MG capsule   Commonly known as:  IMODIUM       ondansetron 8 MG tablet   Commonly known as:  ZOFRAN       ondansetron ODT 4 MG disintegrating tablet   Commonly known as:  ZOFRAN ODT       penicillin v potassium 500 MG tablet   Commonly known as:  VEETID       traMADol 50 MG tablet   Commonly known as:  ULTRAM           Follow-up Information     Schedule an appointment as soon as possible for a visit with Your primary care provider.    Why:  As needed, If symptoms fail to improve        Go to Emergency department.    Why:  As needed, If symptoms worsen               Gerard Pryor MD  12/17/17 5020

## 2017-12-17 NOTE — ED NOTES
Spoke with Liudmila in lab, notified Dr. Pryor adding troponin to blood sent to lab earlier     Brittani Driver RN  12/17/17 [id number]